# Patient Record
Sex: FEMALE
[De-identification: names, ages, dates, MRNs, and addresses within clinical notes are randomized per-mention and may not be internally consistent; named-entity substitution may affect disease eponyms.]

---

## 2018-09-18 ENCOUNTER — HOSPITAL ENCOUNTER (OUTPATIENT)
Dept: HOSPITAL 5 - CATHLABREC | Age: 53
Discharge: HOME | End: 2018-09-18
Attending: INTERNAL MEDICINE
Payer: MEDICAID

## 2018-09-18 VITALS — DIASTOLIC BLOOD PRESSURE: 52 MMHG | SYSTOLIC BLOOD PRESSURE: 115 MMHG

## 2018-09-18 DIAGNOSIS — M16.0: ICD-10-CM

## 2018-09-18 DIAGNOSIS — J45.909: ICD-10-CM

## 2018-09-18 DIAGNOSIS — Z79.82: ICD-10-CM

## 2018-09-18 DIAGNOSIS — Z90.49: ICD-10-CM

## 2018-09-18 DIAGNOSIS — I10: ICD-10-CM

## 2018-09-18 DIAGNOSIS — E78.00: ICD-10-CM

## 2018-09-18 DIAGNOSIS — E06.9: ICD-10-CM

## 2018-09-18 DIAGNOSIS — Z85.9: ICD-10-CM

## 2018-09-18 DIAGNOSIS — Z98.890: ICD-10-CM

## 2018-09-18 DIAGNOSIS — I20.1: Primary | ICD-10-CM

## 2018-09-18 DIAGNOSIS — Z79.899: ICD-10-CM

## 2018-09-18 DIAGNOSIS — F32.9: ICD-10-CM

## 2018-09-18 DIAGNOSIS — Z88.5: ICD-10-CM

## 2018-09-18 LAB
BASOPHILS # (AUTO): 0.1 K/MM3 (ref 0–0.1)
BASOPHILS NFR BLD AUTO: 0.8 % (ref 0–1.8)
BUN SERPL-MCNC: 21 MG/DL (ref 7–17)
BUN/CREAT SERPL: 30 %
CALCIUM SERPL-MCNC: 9.4 MG/DL (ref 8.4–10.2)
EOSINOPHIL # BLD AUTO: 0.2 K/MM3 (ref 0–0.4)
EOSINOPHIL NFR BLD AUTO: 1.9 % (ref 0–4.3)
HCT VFR BLD CALC: 35.9 % (ref 30.3–42.9)
HEMOLYSIS INDEX: 4
HGB BLD-MCNC: 12.1 GM/DL (ref 10.1–14.3)
INR PPP: 0.95 (ref 0.87–1.13)
LYMPHOCYTES # BLD AUTO: 2.6 K/MM3 (ref 1.2–5.4)
LYMPHOCYTES NFR BLD AUTO: 26.6 % (ref 13.4–35)
MCH RBC QN AUTO: 29 PG (ref 28–32)
MCHC RBC AUTO-ENTMCNC: 34 % (ref 30–34)
MCV RBC AUTO: 87 FL (ref 79–97)
MONOCYTES # (AUTO): 0.6 K/MM3 (ref 0–0.8)
MONOCYTES % (AUTO): 6.1 % (ref 0–7.3)
PLATELET # BLD: 263 K/MM3 (ref 140–440)
RBC # BLD AUTO: 4.16 M/MM3 (ref 3.65–5.03)

## 2018-09-18 PROCEDURE — 85610 PROTHROMBIN TIME: CPT

## 2018-09-18 PROCEDURE — 93005 ELECTROCARDIOGRAM TRACING: CPT

## 2018-09-18 PROCEDURE — C1894 INTRO/SHEATH, NON-LASER: HCPCS

## 2018-09-18 PROCEDURE — 93567 NJX CAR CTH SPRVLV AORTGRPHY: CPT

## 2018-09-18 PROCEDURE — 85025 COMPLETE CBC W/AUTO DIFF WBC: CPT

## 2018-09-18 PROCEDURE — 93010 ELECTROCARDIOGRAM REPORT: CPT

## 2018-09-18 PROCEDURE — 99156 MOD SED OTH PHYS/QHP 5/>YRS: CPT

## 2018-09-18 PROCEDURE — 36415 COLL VENOUS BLD VENIPUNCTURE: CPT

## 2018-09-18 PROCEDURE — 93458 L HRT ARTERY/VENTRICLE ANGIO: CPT

## 2018-09-18 PROCEDURE — 80048 BASIC METABOLIC PNL TOTAL CA: CPT

## 2018-09-18 RX ADMIN — LIDOCAINE HYDROCHLORIDE ONE ML: 20 INJECTION, SOLUTION INFILTRATION; PERINEURAL at 08:49

## 2018-09-18 RX ADMIN — LIDOCAINE HYDROCHLORIDE ONE ML: 20 INJECTION, SOLUTION INFILTRATION; PERINEURAL at 08:48

## 2018-09-18 RX ADMIN — FENTANYL CITRATE ONE MCG: 50 INJECTION, SOLUTION INTRAMUSCULAR; INTRAVENOUS at 08:48

## 2018-09-18 RX ADMIN — FENTANYL CITRATE ONE MCG: 50 INJECTION, SOLUTION INTRAMUSCULAR; INTRAVENOUS at 08:47

## 2018-09-18 NOTE — CARDIAC CATHERIZATION REPORT
CARDIAC CATHETERIZATION



INDICATION FOR PROCEDURE:  The patient is a very pleasant 53-year-old 

female with recurrent chest pain, risk factors, syncopal episode, referred for

left heart catheterization.  Risks, benefits, and potential alternatives were

explained at length prior to obtaining informed consent.



PROCEDURE IN DETAIL:  The patient was brought to the cath lab in a

postabsorptive state, prepped and draped in sterile fashion.  Daniel's test in

right hand was normal.  A 2 mL of 2% lidocaine used to anesthetize the right

wrist.  A standard 6-Armenian hydrophilic sheath used to cannulate the right

radial artery via modified Seldinger technique.  All exchanges performed to

exchange a J-tip guidewire.  JL3.5 catheter used to engage the left main.  No

dampening or ventricularization.  Cineangiography performed in all projections. 

JR4 catheter was used to cross the aortic valve under fluoroscopic guidance. 

Left ventriculography performed in 30 LINDSEY and 30 CORETTA projections via hand

injections, catheter flushed.  Manual pullback performed with continuous

pressure monitoring.  Catheter used to engage the right coronary.  No dampening

or ventricularization.  Cineangiography performed in all projections.  JR4

catheter used to cross the aortic valve under fluoroscopic guidance.  Next, due

to recurrent chest pain, we performed a root aortography with a pigtail catheter

in the CORETTA projection.  Next, catheter removed from the body of wire, sheath

removed, manual pressure used to obtain hemostasis.  I directly supervised the

administration of moderate sedation from 8:46 to 9:10 a.m.  There were no

immediate complications.



DATA:  Aortic pressure is 120/70, LV pressure is 120, LVEDP of 25 mmHg.  Left

ventriculography revealed normal systolic performance with estimated ejection

fraction of 55% to 60%.  No evidence of aortic stenosis.



CORONARY ANATOMY:  This is a right dominant system.  Right coronary is a

moderate sized vessel, courses AV groove, distally bifurcates into the posterior

and posterolateral branch, should be noted that there is mild spasm on initial

right coronary angiography.  Intracoronary nitro was given, which vastly

improved and resolved the spasm.  No significant disease in the right dominant

right coronary.  Left main is short, no significant disease, bifurcates into

left anterior descending and left circumflex.  Left circumflex is a moderate

sized vessel, courses AV groove.  No significant disease.  LAD is a moderate

sized vessel, courses anterior interventricular groove, wraps around the apex. 

No significant disease in the LAD.  The root aortography reveals normal contour,

no evidence of dissection, aortic insufficiency or penetrating aortic ulcer.



CONCLUSIONS:

1.  No angiographic evidence of significant epicardial coronary disease in this

right dominant system.

2.  Mild right coronary spasm was noted, resolved very easily with intracoronary

nitroglycerin, likely catheter related.  No significant disease noted.

3.  Normal left ventricular systolic performance, estimated ejection fraction of

55% to 60%.

4.  No evidence of aortic stenosis.

5.  Root aortography without evidence of dissection, penetrating aortic ulcer,

or aortic insufficiency.



At this point, recommend aggressive risk factor modification.  Primary

prevention measures.  Continue Imdur, statin therapy and baby aspirin, stable

cardiac status at this point, standard radial care.  Follow up with me in the

office.  Results of procedure explained at length to the patient and family. 

All questions and concerns were addressed.





DD: 09/18/2018 09:08

DT: 09/18/2018 09:59

JOB# [id number]  [id number]

LUCERO/ROSARIO

## 2018-09-19 NOTE — SHORT STAY SUMMARY
Short Stay Documentation


Date of service: 09/18/18





- History


H&P: obtained from office





- Allergies and Medications


Current Medications: 


 Allergies





codeine Adverse Reaction (Unverified 09/18/18 06:38)


 Shortness of Breath





 Home Medications











 Medication  Instructions  Recorded  Confirmed  Last Taken  Type


 


Albuterol Sulfate [Ventolin HFA] 2 puff IH Q4H PRN 09/18/18 09/18/18 2 Weeks 

Ago History





    ~09/04/18 


 


Aspirin [Lo-Dose Aspirin EC] 81 mg PO DAILY 09/18/18 09/18/18 09/17/18 History


 


AtorvaSTATin [Lipitor] 10 mg PO QHS 09/18/18 09/18/18 09/17/18 History


 


Dicyclomine [Bentyl] 20 mg PO QID 09/18/18 09/18/18 09/17/18 History


 


ISOSORBIDE MONOnitrate [Imdur ER] 15 mg PO DAILY 09/18/18 09/18/18 09/17/18 

History


 


Lisinopril/Hydrochlorothiazide 1 tab PO QDAY 09/18/18 09/18/18 09/18/18 05:00 

History





[Zestoretic 20-25 mg]     


 


Pantoprazole [Protonix] 40 mg PO BID 09/18/18 09/18/18 09/17/18 History


 


Sucralfate [Carafate] 1 gm PO QID 09/18/18 09/18/18 09/17/18 History


 


Tamoxifen Citrate 20 mg PO DAILY 09/18/18 09/18/18 09/17/18 History














- Brief post op/procedure progress note


Date of procedure: 09/18/18


Pre-op diagnosis: chest pain


Post-op diagnosis: same


Procedure: 





[facility] - see dictated cath report


Anesthesia: local


Estimated blood loss: none


Condition: stable





- Disposition


Condition at discharge: Good


Disposition: DC-01 TO HOME OR SELFCARE





- Discharge Diagnoses


(1) Chest pain


Status: Acute   





(2) Syncope


Status: Acute   





Short Stay Discharge Plan


Activity: advance as tolerated


Wound: open to air, keep clean and dry, per your surgeon's advice


Additional Instructions: 


follow up with primary medical doctor in 1 week, return to ([facility]) emergency Room 

for medical emergency, follow up Dr. Smallwood in 1 week.


Follow up with: 


ALFONSO NASH MD [Primary Care Provider] - 7 Days


Forms:  CardCath PCI D/C Instructions